# Patient Record
Sex: MALE | Race: BLACK OR AFRICAN AMERICAN | Employment: OTHER | URBAN - METROPOLITAN AREA
[De-identification: names, ages, dates, MRNs, and addresses within clinical notes are randomized per-mention and may not be internally consistent; named-entity substitution may affect disease eponyms.]

---

## 2017-02-20 ENCOUNTER — TELEPHONE (OUTPATIENT)
Dept: FAMILY MEDICINE CLINIC | Facility: CLINIC | Age: 57
End: 2017-02-20

## 2017-04-30 ENCOUNTER — HOSPITAL ENCOUNTER (EMERGENCY)
Age: 57
Discharge: HOME | End: 2017-04-30
Attending: EMERGENCY MEDICINE
Payer: COMMERCIAL

## 2017-04-30 VITALS
BODY MASS INDEX: 28.49 KG/M2 | SYSTOLIC BLOOD PRESSURE: 143 MMHG | DIASTOLIC BLOOD PRESSURE: 88 MMHG | HEART RATE: 78 BPM | OXYGEN SATURATION: 97 % | RESPIRATION RATE: 16 BRPM | HEIGHT: 73 IN | WEIGHT: 215 LBS | TEMPERATURE: 98 F

## 2017-04-30 DIAGNOSIS — R19.7 DIARRHEA OF PRESUMED INFECTIOUS ORIGIN: Primary | ICD-10-CM

## 2017-04-30 DIAGNOSIS — E86.0 DEHYDRATION: ICD-10-CM

## 2017-04-30 PROCEDURE — 85025 COMPLETE CBC W/AUTO DIFF WBC: CPT

## 2017-04-30 PROCEDURE — 99284 EMERGENCY DEPT VISIT MOD MDM: CPT

## 2017-04-30 PROCEDURE — 36415 COLL VENOUS BLD VENIPUNCTURE: CPT

## 2017-04-30 PROCEDURE — 80053 COMPREHEN METABOLIC PANEL: CPT

## 2017-04-30 NOTE — ED PROVIDER NOTES
Patient Seen in: THE [facility] Emergency Department In [place]    History   Patient presents with:  Nausea/Vomiting/Diarrhea (gastrointestinal)    Stated Complaint: diarrhea, joint pain, and headache    HPI    Patient presents with frequent diarrhea over the Cardiovascular: Normal rate, regular rhythm, normal heart sounds and intact distal pulses. No murmur heard. Pulmonary/Chest: Effort normal and breath sounds normal.   Abdominal: Soft. Bowel sounds are increased. There is no tenderness.  There is no gu Clinical Impression:  Diarrhea of presumed infectious origin  (primary encounter diagnosis)  Dehydration    Disposition:  Discharge    Follow-up:  Malini Martinez [facility]  [phone]    In 2 days        Medication

## 2017-05-18 ENCOUNTER — OFFICE VISIT (OUTPATIENT)
Dept: FAMILY MEDICINE CLINIC | Facility: CLINIC | Age: 57
End: 2017-05-18

## 2017-05-18 ENCOUNTER — LAB ENCOUNTER (OUTPATIENT)
Dept: LAB | Age: 57
End: 2017-05-18
Attending: INTERNAL MEDICINE
Payer: COMMERCIAL

## 2017-05-18 VITALS
DIASTOLIC BLOOD PRESSURE: 84 MMHG | BODY MASS INDEX: 28.39 KG/M2 | WEIGHT: 214.19 LBS | HEART RATE: 72 BPM | SYSTOLIC BLOOD PRESSURE: 144 MMHG | HEIGHT: 73 IN | RESPIRATION RATE: 16 BRPM | TEMPERATURE: 98 F

## 2017-05-18 DIAGNOSIS — I10 ESSENTIAL HYPERTENSION: ICD-10-CM

## 2017-05-18 DIAGNOSIS — Z00.00 WELLNESS EXAMINATION: Primary | ICD-10-CM

## 2017-05-18 DIAGNOSIS — Z00.00 LABORATORY EXAM ORDERED AS PART OF ROUTINE GENERAL MEDICAL EXAMINATION: ICD-10-CM

## 2017-05-18 DIAGNOSIS — Z12.5 SCREENING FOR PROSTATE CANCER: ICD-10-CM

## 2017-05-18 DIAGNOSIS — Z12.11 ENCOUNTER FOR SCREENING COLONOSCOPY: ICD-10-CM

## 2017-05-18 PROCEDURE — 99386 PREV VISIT NEW AGE 40-64: CPT | Performed by: INTERNAL MEDICINE

## 2017-05-18 PROCEDURE — 36415 COLL VENOUS BLD VENIPUNCTURE: CPT | Performed by: INTERNAL MEDICINE

## 2017-05-18 PROCEDURE — 90715 TDAP VACCINE 7 YRS/> IM: CPT | Performed by: INTERNAL MEDICINE

## 2017-05-18 PROCEDURE — 80061 LIPID PANEL: CPT | Performed by: INTERNAL MEDICINE

## 2017-05-18 PROCEDURE — 81003 URINALYSIS AUTO W/O SCOPE: CPT | Performed by: INTERNAL MEDICINE

## 2017-05-18 PROCEDURE — 84153 ASSAY OF PSA TOTAL: CPT | Performed by: INTERNAL MEDICINE

## 2017-05-18 PROCEDURE — 90471 IMMUNIZATION ADMIN: CPT | Performed by: INTERNAL MEDICINE

## 2017-05-18 PROCEDURE — 80050 GENERAL HEALTH PANEL: CPT | Performed by: INTERNAL MEDICINE

## 2017-05-18 RX ORDER — AMLODIPINE BESYLATE 10 MG/1
1 TABLET ORAL DAILY
Refills: 5 | COMMUNITY
Start: 2017-03-22 | End: 2017-05-18

## 2017-05-18 RX ORDER — AMLODIPINE BESYLATE 10 MG/1
10 TABLET ORAL DAILY
Qty: 30 TABLET | Refills: 3 | Status: SHIPPED | OUTPATIENT
Start: 2017-05-18 | End: 2017-09-06

## 2017-05-18 NOTE — PROGRESS NOTES
[facility] Internal Medicine Office Note  Chief Complaint:   Patient presents with:  Establish Care      HPI:   This is a 56year old male coming in for establishing care    Blood pressure - started amlodipine 10mg two years ago and had not had f 28.27 kg/m2 Estimated body mass index is 28.27 kg/(m^2) as calculated from the following:    Height as of this encounter: 73\". Weight as of this encounter: 214 lb 3.2 oz. Vital signs reviewed. Appears stated age, well groomed.   Physical Exam   Vitals Screening for prostate cancer  -     PSA; Future    Other orders  -     AmLODIPine Besylate 10 MG Oral Tab;  Take 1 tablet (10 mg total) by mouth daily.  -     TdaP (Adacel, Boostrix) (67346) (DX V06.1/Z23)      Orders Placed This Encounter  CBC With Antoni Chacon

## 2017-05-18 NOTE — PATIENT INSTRUCTIONS
Thank you for choosing Hermilo Partida MD at [place] [address]  To Do: Jazlyn Ramirez.  1. Blood work today  2. Check blood pressure at home at least 2 hours after taking medication.   Call office if most numbers are below 110 for top number (systolic) Janet Bartlett MD orders a CT or MRI, it may take up to 10 business days to receive approval from your insurance company.      Once our office has called informing you that the insurance company approved your testing, please call Central Scheduling at [phone]

## 2017-05-19 DIAGNOSIS — E87.6 HYPOKALEMIA: Primary | ICD-10-CM

## 2017-05-19 DIAGNOSIS — E87.6 LOW BLOOD POTASSIUM: Primary | ICD-10-CM

## 2017-09-07 RX ORDER — AMLODIPINE BESYLATE 10 MG/1
TABLET ORAL
Qty: 30 TABLET | Refills: 0 | Status: SHIPPED | OUTPATIENT
Start: 2017-09-07 | End: 2017-10-03

## 2017-09-07 NOTE — TELEPHONE ENCOUNTER
Hypertension Medications Protocol Passed  Requesting Amlodipine 10mg  LOV: 5/18/17  RTC: none specified  Last Labs: 5/18/17  Filled: 5/18/17 #30with 3 refills    Future Appointments  Date Time Provider Joce Holguin   10/16/2017 1:30 PM Yola Rodriguez

## 2017-10-05 RX ORDER — AMLODIPINE BESYLATE 10 MG/1
TABLET ORAL
Qty: 30 TABLET | Refills: 0 | Status: SHIPPED | OUTPATIENT
Start: 2017-10-05 | End: 2017-12-11

## 2017-10-05 NOTE — TELEPHONE ENCOUNTER
He never had follow up after starting amlodipine with previous physician and has never had follow up after starting amlodipine again with me!   Needs ov. 1 mo refill given

## 2017-11-03 NOTE — TELEPHONE ENCOUNTER
Requesting Amlodipine  LOV: 5/18/17  RTC: not noted  Last Relevant Labs: 5/18/17  Filled: 10/5/17 #30 with 0 refills    Future Appointments  Date Time Provider Joce Holguin   11/13/2017 10:20 AM MD Christopher Mixon Guest       Dr Glen Moyer

## 2017-11-10 RX ORDER — AMLODIPINE BESYLATE 10 MG/1
TABLET ORAL
Qty: 30 TABLET | Refills: 0 | OUTPATIENT
Start: 2017-11-10

## 2017-11-10 NOTE — TELEPHONE ENCOUNTER
Future Appointments  Date Time Provider Joce Holguin   11/13/2017 10:20 AM Jerson Sweeney MD [facility]-     MD gave 1 mo exception last mo and stated patient needed to make appt. We notified patient and have reached out several times.  Med

## 2017-11-13 PROBLEM — Z12.11 COLON CANCER SCREENING: Status: ACTIVE | Noted: 2017-11-13

## 2017-12-08 ENCOUNTER — TELEPHONE (OUTPATIENT)
Dept: FAMILY MEDICINE CLINIC | Facility: CLINIC | Age: 57
End: 2017-12-08

## 2017-12-08 NOTE — TELEPHONE ENCOUNTER
Left message to reset appt as Dr. Casey Busch has relocated to [facility] office. Information given for both locations to reschedule.

## 2017-12-11 RX ORDER — AMLODIPINE BESYLATE 10 MG/1
TABLET ORAL
Qty: 30 TABLET | Refills: 0 | Status: SHIPPED | OUTPATIENT
Start: 2017-12-11 | End: 2018-01-11

## 2017-12-11 NOTE — TELEPHONE ENCOUNTER
Requesting amlodipine, medication pending if appropriate. Last OV 5/18/17, RTC request for 3 weeks. Scheduled for f/u 12/19/17. Last labs drawn 5/18/17, but requested for repeat lab draw in 2 weeks which he did not do.      Last refill 10/5/17 #

## 2017-12-11 NOTE — TELEPHONE ENCOUNTER
Patient called requesting refill for:  AMLODIPINE BESYLATE 10 MG Oral Tab    Has upcoming appointment, but needs refill until then  Future Appointments  Date Time Provider Joce Holguin   12/19/2017 11:00 AM Sunny Cook MD [facility] [place]

## 2017-12-14 ENCOUNTER — HOSPITAL ENCOUNTER (OUTPATIENT)
Age: 57
Discharge: HOME | End: 2017-12-14
Attending: FAMILY MEDICINE
Payer: COMMERCIAL

## 2017-12-14 ENCOUNTER — TELEPHONE (OUTPATIENT)
Dept: INTERNAL MEDICINE CLINIC | Facility: CLINIC | Age: 57
End: 2017-12-14

## 2017-12-14 ENCOUNTER — APPOINTMENT (OUTPATIENT)
Dept: GENERAL RADIOLOGY | Age: 57
End: 2017-12-14
Attending: FAMILY MEDICINE
Payer: COMMERCIAL

## 2017-12-14 VITALS
HEART RATE: 71 BPM | SYSTOLIC BLOOD PRESSURE: 147 MMHG | TEMPERATURE: 98 F | OXYGEN SATURATION: 97 % | WEIGHT: 216 LBS | HEIGHT: 73 IN | RESPIRATION RATE: 20 BRPM | BODY MASS INDEX: 28.63 KG/M2 | DIASTOLIC BLOOD PRESSURE: 98 MMHG

## 2017-12-14 DIAGNOSIS — M94.0 COSTOCHONDRITIS: Primary | ICD-10-CM

## 2017-12-14 PROCEDURE — 99205 OFFICE O/P NEW HI 60 MIN: CPT

## 2017-12-14 PROCEDURE — 84484 ASSAY OF TROPONIN QUANT: CPT

## 2017-12-14 PROCEDURE — 36415 COLL VENOUS BLD VENIPUNCTURE: CPT

## 2017-12-14 PROCEDURE — 99215 OFFICE O/P EST HI 40 MIN: CPT

## 2017-12-14 PROCEDURE — 80047 BASIC METABLC PNL IONIZED CA: CPT

## 2017-12-14 PROCEDURE — 71020 XR CHEST PA + LAT CHEST (CPT=71020): CPT | Performed by: FAMILY MEDICINE

## 2017-12-14 PROCEDURE — 93010 ELECTROCARDIOGRAM REPORT: CPT

## 2017-12-14 PROCEDURE — 93005 ELECTROCARDIOGRAM TRACING: CPT

## 2017-12-14 RX ORDER — NAPROXEN 500 MG/1
500 TABLET ORAL 2 TIMES DAILY PRN
Qty: 20 TABLET | Refills: 0 | Status: SHIPPED | OUTPATIENT
Start: 2017-12-14 | End: 2017-12-21

## 2017-12-14 NOTE — TELEPHONE ENCOUNTER
Patient called with symptoms of chest pains since previous night immediately sent to triage; when transferring patient hung up. Per nurse she called back to discuss further.

## 2017-12-14 NOTE — ED INITIAL ASSESSMENT (HPI)
C/o chest pain for more than 24 hours characterized as dull and achy worse when bending, stretching arm , cough, yawn and sneeze. Denies short of breath.

## 2017-12-14 NOTE — ED PROVIDER NOTES
Patient Seen in: [facility]    History   Patient presents with:  Chest Pain    Stated Complaint: pain in chest when he stretches bends (left side )    HPI    72-year-old male presents for left-sided chest pain.   Patient states h reactive to light. Neck: Normal range of motion. Neck supple. Cardiovascular: Normal rate, regular rhythm, normal heart sounds and intact distal pulses. Pulmonary/Chest: Effort normal and breath sounds normal. No respiratory distress.  He has no whee

## 2017-12-14 NOTE — TELEPHONE ENCOUNTER
Pt called into office stating that he was experiencing chest pain. As psr was getting ready to transfer call pt hung up the phone. Pt called back. Pt stated that since last [address] [facility] at 10pm he began to have L side chest soreness, exacerbated with movement.

## 2017-12-19 ENCOUNTER — OFFICE VISIT (OUTPATIENT)
Dept: INTERNAL MEDICINE CLINIC | Facility: CLINIC | Age: 57
End: 2017-12-19

## 2017-12-19 VITALS
HEIGHT: 73 IN | SYSTOLIC BLOOD PRESSURE: 148 MMHG | HEART RATE: 80 BPM | WEIGHT: 215 LBS | DIASTOLIC BLOOD PRESSURE: 70 MMHG | RESPIRATION RATE: 20 BRPM | BODY MASS INDEX: 28.49 KG/M2 | TEMPERATURE: 98 F

## 2017-12-19 DIAGNOSIS — I10 ESSENTIAL HYPERTENSION: Primary | ICD-10-CM

## 2017-12-19 DIAGNOSIS — E78.2 MIXED HYPERLIPIDEMIA: ICD-10-CM

## 2017-12-19 DIAGNOSIS — N52.9 ERECTILE DYSFUNCTION, UNSPECIFIED ERECTILE DYSFUNCTION TYPE: ICD-10-CM

## 2017-12-19 PROCEDURE — 99213 OFFICE O/P EST LOW 20 MIN: CPT | Performed by: INTERNAL MEDICINE

## 2017-12-19 RX ORDER — LOSARTAN POTASSIUM 25 MG/1
25 TABLET ORAL DAILY
Qty: 30 TABLET | Refills: 1 | Status: SHIPPED | OUTPATIENT
Start: 2017-12-19 | End: 2018-01-09

## 2017-12-19 RX ORDER — SILDENAFIL 50 MG/1
50 TABLET, FILM COATED ORAL
Qty: 10 TABLET | Refills: 5 | Status: SHIPPED | OUTPATIENT
Start: 2017-12-19 | End: 2018-09-04

## 2017-12-19 NOTE — PATIENT INSTRUCTIONS
1. Start losartan daily in addition to amlodipine    2. Take 1/2 tablet of viagra and if not effective, can take full tablet. Do not take more than 1 dose in 24 hours.

## 2017-12-19 NOTE — PROGRESS NOTES
[facility] Internal Medicine Office Note  Chief Complaint:   Patient presents with:  Medication Follow-Up      HPI:   This is a 56year old male coming in for blood pressure check  HPI  Issues with erectile dysfunction for awhile  Difficulty main Location: Left arm, Patient Position: Sitting, Cuff Size: adult)   Pulse 80   Temp 97.8 °F (36.6 °C) (Oral)   Resp 20   Ht 73\"   Wt 215 lb   BMI 28.37 kg/m²  Estimated body mass index is 28.37 kg/m² as calculated from the following:    Height as of this e Dysfunction.            Imaging & Consults:  None    FIT Colorectal Screening due on 01/13/2012  Colonoscopy,10 Years due on 01/13/2012  Influenza Vaccine(1) due on 09/01/2017  Patient/Caregiver Education: Patient/Caregiver Education: There are no barriers

## 2018-01-05 ENCOUNTER — TELEPHONE (OUTPATIENT)
Dept: INTERNAL MEDICINE CLINIC | Facility: CLINIC | Age: 58
End: 2018-01-05

## 2018-01-05 NOTE — TELEPHONE ENCOUNTER
Called patient regarding no show/missed appointment. Left voice message regarding cancellation/no show policy. Per Dr. Tino Bustillo, offered pt to come in today at 1:00 PM to be seen.   (Left voice message)

## 2018-01-10 ENCOUNTER — SURGERY (OUTPATIENT)
Age: 58
End: 2018-01-10

## 2018-01-10 ENCOUNTER — HOSPITAL ENCOUNTER (OUTPATIENT)
Facility: HOSPITAL | Age: 58
Setting detail: HOSPITAL OUTPATIENT SURGERY
Discharge: HOME | End: 2018-01-10
Attending: INTERNAL MEDICINE | Admitting: INTERNAL MEDICINE
Payer: COMMERCIAL

## 2018-01-10 VITALS
OXYGEN SATURATION: 95 % | DIASTOLIC BLOOD PRESSURE: 100 MMHG | RESPIRATION RATE: 18 BRPM | HEIGHT: 73 IN | WEIGHT: 215 LBS | TEMPERATURE: 99 F | HEART RATE: 79 BPM | BODY MASS INDEX: 28.49 KG/M2 | SYSTOLIC BLOOD PRESSURE: 141 MMHG

## 2018-01-10 DIAGNOSIS — Z12.11 SCREENING FOR COLON CANCER: ICD-10-CM

## 2018-01-10 PROCEDURE — 99152 MOD SED SAME PHYS/QHP 5/>YRS: CPT | Performed by: INTERNAL MEDICINE

## 2018-01-10 PROCEDURE — 0DJD8ZZ INSPECTION OF LOWER INTESTINAL TRACT, VIA NATURAL OR ARTIFICIAL OPENING ENDOSCOPIC: ICD-10-PCS | Performed by: INTERNAL MEDICINE

## 2018-01-10 RX ORDER — SODIUM CHLORIDE, SODIUM LACTATE, POTASSIUM CHLORIDE, CALCIUM CHLORIDE 600; 310; 30; 20 MG/100ML; MG/100ML; MG/100ML; MG/100ML
INJECTION, SOLUTION INTRAVENOUS CONTINUOUS
Status: DISCONTINUED | OUTPATIENT
Start: 2018-01-10 | End: 2018-01-10

## 2018-01-10 RX ORDER — MIDAZOLAM HYDROCHLORIDE 1 MG/ML
INJECTION INTRAMUSCULAR; INTRAVENOUS
Status: DISCONTINUED | OUTPATIENT
Start: 2018-01-10 | End: 2018-01-10

## 2018-01-10 NOTE — OPERATIVE REPORT
[facility]  Colonoscopy Report      Craige Scripture.  Patient Status:  Hospital Outpatient Surgery    1960 MRN [id number]   [facility] ENDOSCOPY Attending Tila Sims MD       DATE OF OPERATION: 1/10/2018     PREOPERATIVE DIAG

## 2018-01-10 NOTE — H&P
[facility]  Pre-procedure History and Physical      Caseyjohnathan Garciapatria Ross.  Patient Status:  Hospital Outpatient Surgery    1960 MRN [id number]   [facility] ENDOSCOPY Attending Kathe Bell MD   Hosp Day # 0 PCP Gavin Mercado MD

## 2018-01-13 RX ORDER — AMLODIPINE BESYLATE 10 MG/1
TABLET ORAL
Qty: 30 TABLET | Refills: 2 | Status: SHIPPED | OUTPATIENT
Start: 2018-01-13 | End: 2018-04-17

## 2018-02-13 RX ORDER — LOSARTAN POTASSIUM 25 MG/1
TABLET ORAL
Qty: 30 TABLET | Refills: 1 | Status: SHIPPED | OUTPATIENT
Start: 2018-02-13 | End: 2018-09-03

## 2018-02-13 NOTE — TELEPHONE ENCOUNTER
Losartan 25 mg 1 tab daily filled 12-19-17 30 with 1 refill     LOv 12-19-17     HTN - taking amlodipine and BP at home in 017Z systolic     Essential hypertension - adding losartan 25mg since BP high in office and at home

## 2018-04-18 RX ORDER — AMLODIPINE BESYLATE 10 MG/1
TABLET ORAL
Qty: 30 TABLET | Refills: 2 | Status: SHIPPED | OUTPATIENT
Start: 2018-04-18 | End: 2018-08-13

## 2018-08-14 RX ORDER — AMLODIPINE BESYLATE 10 MG/1
10 TABLET ORAL DAILY
Qty: 90 TABLET | Refills: 0 | Status: SHIPPED | OUTPATIENT
Start: 2018-08-14 | End: 2018-12-31

## 2018-08-14 NOTE — TELEPHONE ENCOUNTER
Refill requested: Amlodipine 10 mg     Failed protocol    Last refill: 4/18/18 #30 2R    Relevant Labs:    BP Readings from Last 3 Encounters:  01/10/18 : 141/100  12/19/17 : 148/70  12/14/17 : 147/98      Last OV / RTC advised: 12/19/17 Dr Charles Adams RTC?   Ap

## 2018-09-03 ENCOUNTER — APPOINTMENT (OUTPATIENT)
Dept: GENERAL RADIOLOGY | Age: 58
End: 2018-09-03
Attending: EMERGENCY MEDICINE
Payer: COMMERCIAL

## 2018-09-03 ENCOUNTER — HOSPITAL ENCOUNTER (EMERGENCY)
Age: 58
Discharge: HOME | End: 2018-09-03
Payer: COMMERCIAL

## 2018-09-03 VITALS
TEMPERATURE: 99 F | RESPIRATION RATE: 16 BRPM | WEIGHT: 210 LBS | HEART RATE: 67 BPM | HEIGHT: 73 IN | DIASTOLIC BLOOD PRESSURE: 89 MMHG | SYSTOLIC BLOOD PRESSURE: 130 MMHG | OXYGEN SATURATION: 98 % | BODY MASS INDEX: 27.83 KG/M2

## 2018-09-03 DIAGNOSIS — M25.462 KNEE EFFUSION, LEFT: Primary | ICD-10-CM

## 2018-09-03 PROCEDURE — 73562 X-RAY EXAM OF KNEE 3: CPT | Performed by: EMERGENCY MEDICINE

## 2018-09-03 PROCEDURE — 99283 EMERGENCY DEPT VISIT LOW MDM: CPT | Performed by: NURSE PRACTITIONER

## 2018-09-03 RX ORDER — IBUPROFEN 600 MG/1
600 TABLET ORAL ONCE
Status: COMPLETED | OUTPATIENT
Start: 2018-09-03 | End: 2018-09-03

## 2018-09-03 NOTE — ED PROVIDER NOTES
Patient Seen in: THE [facility] Emergency Department In [place]    History   Patient presents with:  Lower Extremity Injury (musculoskeletal)    Stated Complaint: left knee swelling and pain    HPI  Patient is a 16-year-old gentleman who presents with left kn Pulmonary/Chest: Effort normal.   Musculoskeletal: He exhibits tenderness.    Left Knee Exam:   - Gross deformity: None  - Soft tissue swelling: suprapatellar  - Effusion: small  - Discoloration/ ecchymosis: None  - ROM: flexion to   - Patellar testing: T ibuprofen and/or acetaminophen as needed for discomfort.   Patient to follow-up with orthopedic referral.          Disposition and Plan     Clinical Impression:  Knee effusion, left  (primary encounter diagnosis)    Disposition:  Discharge  9/3/2018 12:19 p

## 2018-09-03 NOTE — ED INITIAL ASSESSMENT (HPI)
Pt states in June standing on his bed to clean the ceiling fan, has been painful and swollen, right foot got caught in foot board and fell  Off the bed onto left knee

## 2018-09-04 PROBLEM — M23.92 INTERNAL DERANGEMENT OF KNEE, LEFT: Status: ACTIVE | Noted: 2018-09-04

## 2018-09-04 PROBLEM — M65.9 SYNOVITIS OF LEFT KNEE: Status: ACTIVE | Noted: 2018-09-04

## 2018-10-03 PROBLEM — M25.462 EFFUSION, LEFT KNEE: Status: ACTIVE | Noted: 2018-10-03

## 2019-01-01 NOTE — TELEPHONE ENCOUNTER
Refill requested:   Requested Prescriptions     Pending Prescriptions Disp Refills   • AMLODIPINE BESYLATE 10 MG Oral Tab [Pharmacy Med Name: AMLODIPINE BESYLATE 10MG TABLETS] 90 tablet 0     Sig: TAKE 1 TABLET(10 MG) BY MOUTH DAILY       Failed protocol

## 2019-01-02 RX ORDER — AMLODIPINE BESYLATE 10 MG/1
TABLET ORAL
Qty: 30 TABLET | Refills: 0 | Status: SHIPPED | OUTPATIENT
Start: 2019-01-02 | End: 2019-03-28

## 2019-01-22 ENCOUNTER — OFFICE VISIT (OUTPATIENT)
Dept: INTERNAL MEDICINE CLINIC | Facility: CLINIC | Age: 59
End: 2019-01-22
Payer: COMMERCIAL

## 2019-01-22 VITALS
WEIGHT: 217.5 LBS | TEMPERATURE: 98 F | RESPIRATION RATE: 18 BRPM | HEART RATE: 89 BPM | BODY MASS INDEX: 28.83 KG/M2 | SYSTOLIC BLOOD PRESSURE: 148 MMHG | HEIGHT: 73 IN | DIASTOLIC BLOOD PRESSURE: 84 MMHG | OXYGEN SATURATION: 95 %

## 2019-01-22 DIAGNOSIS — Z12.5 SCREENING FOR PROSTATE CANCER: ICD-10-CM

## 2019-01-22 DIAGNOSIS — N52.9 ERECTILE DYSFUNCTION, UNSPECIFIED ERECTILE DYSFUNCTION TYPE: ICD-10-CM

## 2019-01-22 DIAGNOSIS — I10 ESSENTIAL HYPERTENSION: Primary | ICD-10-CM

## 2019-01-22 DIAGNOSIS — Z00.00 LABORATORY EXAM ORDERED AS PART OF ROUTINE GENERAL MEDICAL EXAMINATION: ICD-10-CM

## 2019-01-22 PROCEDURE — 99213 OFFICE O/P EST LOW 20 MIN: CPT | Performed by: INTERNAL MEDICINE

## 2019-01-22 RX ORDER — SILDENAFIL 50 MG/1
50 TABLET, FILM COATED ORAL
Qty: 90 TABLET | Refills: 3 | Status: SHIPPED | OUTPATIENT
Start: 2019-01-22 | End: 2019-07-12 | Stop reason: ALTCHOICE

## 2019-01-22 RX ORDER — LOSARTAN POTASSIUM 25 MG/1
25 TABLET ORAL DAILY
Qty: 90 TABLET | Refills: 1 | Status: SHIPPED | OUTPATIENT
Start: 2019-01-22 | End: 2019-05-24 | Stop reason: ALTCHOICE

## 2019-01-22 NOTE — PROGRESS NOTES
[facility] Internal Medicine Office Note  Chief Complaint:   Patient presents with:  Medication Follow-Up      HPI:   This is a 64year old male coming in for blood pressure check  HPI    He started losartan but only took for a few days after las Height as of this encounter: 73\". Weight as of this encounter: 217 lb 8 oz. Vital signs reviewed. Appears stated age, well groomed. Physical Exam   Vitals reviewed. Constitutional: He appears well-developed. No distress.    HENT:   Head: Normoceph 05/18/2018  Patient/Caregiver Education: Patient/Caregiver Education: There are no barriers to learning. Medical education done. Outcome: Patient verbalizes understanding.  Patient is notified to call with any questions, complications, allergies, or worseni

## 2019-01-22 NOTE — PATIENT INSTRUCTIONS
Start losartan once daily and continue amlodipine    Check blood pressure at home and bring in log of blood pressure readings (check mid-day when both medications are fully in your system would be best)

## 2019-03-14 ENCOUNTER — OFFICE VISIT (OUTPATIENT)
Dept: FAMILY MEDICINE CLINIC | Facility: CLINIC | Age: 59
End: 2019-03-14
Payer: COMMERCIAL

## 2019-03-14 VITALS
OXYGEN SATURATION: 98 % | HEART RATE: 78 BPM | HEIGHT: 73 IN | RESPIRATION RATE: 18 BRPM | TEMPERATURE: 99 F | SYSTOLIC BLOOD PRESSURE: 134 MMHG | DIASTOLIC BLOOD PRESSURE: 70 MMHG | BODY MASS INDEX: 28.23 KG/M2 | WEIGHT: 213 LBS

## 2019-03-14 DIAGNOSIS — R05.9 COUGH IN ADULT PATIENT: Primary | ICD-10-CM

## 2019-03-14 DIAGNOSIS — R19.7 DIARRHEA, UNSPECIFIED TYPE: ICD-10-CM

## 2019-03-14 PROCEDURE — 99213 OFFICE O/P EST LOW 20 MIN: CPT | Performed by: NURSE PRACTITIONER

## 2019-03-14 RX ORDER — AZITHROMYCIN 250 MG/1
TABLET, FILM COATED ORAL
Qty: 6 TABLET | Refills: 0 | Status: SHIPPED | OUTPATIENT
Start: 2019-03-14 | End: 2019-05-24 | Stop reason: ALTCHOICE

## 2019-03-14 NOTE — PATIENT INSTRUCTIONS
·  PLAN: Zithromax take as directed. Finish all the medication even if you feel better. · Probiotics or yogurt daily during antibiotic use will help decrease stomach upset and restore good bacteria to the gut.  Double up at mealtime for at least two weeks

## 2019-03-14 NOTE — PROGRESS NOTES
HPI:   Mansi Hill. is a 64year old male who presents with ill symptoms for  4  days. Patient reports body aches and chills, today began with diarrhea. Has tried Nyquil and Mucinex with mild intermittent relief.        Current Outpatient Medications: supple,positive anterior/posterior cervical adenopathy  LUNGS: clear to auscultation all lobes  CARDIO: RRR without murmur      ASSESSMENT AND PLAN:    PLAN:John was seen today for body ache and/or chills, diarrhea and cough.     Diagnoses and all orders if not improving,  or sooner if worsening symptoms. Seek immediate care if inability to swallow or breathe.

## 2019-03-29 RX ORDER — AMLODIPINE BESYLATE 10 MG/1
TABLET ORAL
Qty: 30 TABLET | Refills: 0 | Status: SHIPPED | OUTPATIENT
Start: 2019-03-29 | End: 2019-06-27

## 2019-06-27 NOTE — TELEPHONE ENCOUNTER
Patient calling in requesting a refill for AMLODIPINE BESYLATE 10 MG Oral Tab  Upcoming Appointment: 07/05/19    To be sent to:  [address], [address] [place] [address], [phone], [phone]

## 2019-06-28 RX ORDER — AMLODIPINE BESYLATE 10 MG/1
10 TABLET ORAL
Qty: 90 TABLET | Refills: 1 | Status: SHIPPED | OUTPATIENT
Start: 2019-06-28 | End: 2021-01-21

## 2019-06-28 NOTE — TELEPHONE ENCOUNTER
Amlodipine Bestlate 10 MG Oral tab   Take one tablet daily by mouth       Failed Protocol   CMP or BMP in past 12 months         Last OV relevant to medication: 01/22/2019  Last refill date: 03/29/2019   #/refills: #30, 0   When pt was asked to return for

## 2019-07-12 ENCOUNTER — OFFICE VISIT (OUTPATIENT)
Dept: INTERNAL MEDICINE CLINIC | Facility: CLINIC | Age: 59
End: 2019-07-12
Payer: COMMERCIAL

## 2019-07-12 VITALS
RESPIRATION RATE: 16 BRPM | BODY MASS INDEX: 29.69 KG/M2 | TEMPERATURE: 98 F | SYSTOLIC BLOOD PRESSURE: 146 MMHG | WEIGHT: 224 LBS | HEIGHT: 73 IN | OXYGEN SATURATION: 100 % | HEART RATE: 86 BPM | DIASTOLIC BLOOD PRESSURE: 88 MMHG

## 2019-07-12 DIAGNOSIS — I10 ESSENTIAL HYPERTENSION: ICD-10-CM

## 2019-07-12 DIAGNOSIS — Z00.00 ENCOUNTER FOR ROUTINE ADULT MEDICAL EXAMINATION: Primary | ICD-10-CM

## 2019-07-12 PROCEDURE — 99396 PREV VISIT EST AGE 40-64: CPT | Performed by: INTERNAL MEDICINE

## 2019-07-12 RX ORDER — AMLODIPINE BESYLATE 10 MG/1
10 TABLET ORAL DAILY
Qty: 90 TABLET | Refills: 1 | Status: SHIPPED | OUTPATIENT
Start: 2019-07-12 | End: 2020-05-10

## 2019-07-12 RX ORDER — LOSARTAN POTASSIUM 25 MG/1
25 TABLET ORAL DAILY
Qty: 90 TABLET | Refills: 1 | Status: SHIPPED | OUTPATIENT
Start: 2019-07-12 | End: 2020-05-10

## 2019-07-12 NOTE — PATIENT INSTRUCTIONS
Take amlodipine 10mg in the morning and 1/2 tablet of losartan in the evening     Follow up for blood work - test before 10 am due to Dr. Elias Hooker order for testosterone

## 2019-07-12 NOTE — PROGRESS NOTES
[facility] Internal Medicine Office Note  Chief Complaint:   Patient presents with:   Follow - Up: Blood pressure      HPI:   This is a 64year old male coming in for blood pressure follow up  HPI  HTN - He got dizzy when taking amlodipine and los Neurological: Negative. Hematological: Negative. Psychiatric/Behavioral: Negative.          EXAM:   /88 (BP Location: Right arm, Patient Position: Sitting, Cuff Size: large)   Pulse 86   Temp 98.2 °F (36.8 °C) (Oral)   Resp 16   Ht 73\"   Wt 2 by mouth daily. Meds & Refills for this Visit:  Requested Prescriptions     Signed Prescriptions Disp Refills   • Losartan Potassium 25 MG Oral Tab 90 tablet 1     Sig: Take 1 tablet (25 mg total) by mouth daily.    • amLODIPine Besylate 10 MG Oral Tab 9

## 2019-07-27 ENCOUNTER — LAB ENCOUNTER (OUTPATIENT)
Dept: LAB | Age: 59
End: 2019-07-27
Attending: INTERNAL MEDICINE
Payer: COMMERCIAL

## 2019-07-27 DIAGNOSIS — Z00.00 LABORATORY EXAM ORDERED AS PART OF ROUTINE GENERAL MEDICAL EXAMINATION: ICD-10-CM

## 2019-07-27 DIAGNOSIS — Z12.5 SCREENING FOR PROSTATE CANCER: ICD-10-CM

## 2019-07-27 DIAGNOSIS — N52.01 ERECTILE DYSFUNCTION DUE TO ARTERIAL INSUFFICIENCY: ICD-10-CM

## 2019-07-27 LAB
ALBUMIN SERPL-MCNC: 3.5 G/DL (ref 3.4–5)
ALBUMIN/GLOB SERPL: 0.9 {RATIO} (ref 1–2)
ALP LIVER SERPL-CCNC: 89 U/L (ref 45–117)
ALT SERPL-CCNC: 38 U/L (ref 16–61)
ANION GAP SERPL CALC-SCNC: 7 MMOL/L (ref 0–18)
AST SERPL-CCNC: 26 U/L (ref 15–37)
BASOPHILS # BLD AUTO: 0.05 X10(3) UL (ref 0–0.2)
BASOPHILS NFR BLD AUTO: 1.1 %
BILIRUB SERPL-MCNC: 0.5 MG/DL (ref 0.1–2)
BUN BLD-MCNC: 13 MG/DL (ref 7–18)
BUN/CREAT SERPL: 9.6 (ref 10–20)
CALCIUM BLD-MCNC: 8.6 MG/DL (ref 8.5–10.1)
CHLORIDE SERPL-SCNC: 106 MMOL/L (ref 98–112)
CHOLEST SMN-MCNC: 252 MG/DL (ref ?–200)
CO2 SERPL-SCNC: 28 MMOL/L (ref 21–32)
COMPLEXED PSA SERPL-MCNC: 1.37 NG/ML (ref ?–4)
CREAT BLD-MCNC: 1.35 MG/DL (ref 0.7–1.3)
DEPRECATED RDW RBC AUTO: 39.1 FL (ref 35.1–46.3)
EOSINOPHIL # BLD AUTO: 0.26 X10(3) UL (ref 0–0.7)
EOSINOPHIL NFR BLD AUTO: 5.5 %
ERYTHROCYTE [DISTWIDTH] IN BLOOD BY AUTOMATED COUNT: 13.1 % (ref 11–15)
GLOBULIN PLAS-MCNC: 4 G/DL (ref 2.8–4.4)
GLUCOSE BLD-MCNC: 86 MG/DL (ref 70–99)
HCT VFR BLD AUTO: 45.6 % (ref 39–53)
HDLC SERPL-MCNC: 42 MG/DL (ref 40–59)
HGB BLD-MCNC: 16.3 G/DL (ref 13–17.5)
IMM GRANULOCYTES # BLD AUTO: 0 X10(3) UL (ref 0–1)
IMM GRANULOCYTES NFR BLD: 0 %
LDLC SERPL CALC-MCNC: 141 MG/DL (ref ?–100)
LYMPHOCYTES # BLD AUTO: 1.76 X10(3) UL (ref 1–4)
LYMPHOCYTES NFR BLD AUTO: 37.1 %
M PROTEIN MFR SERPL ELPH: 7.5 G/DL (ref 6.4–8.2)
MCH RBC QN AUTO: 29.4 PG (ref 26–34)
MCHC RBC AUTO-ENTMCNC: 35.7 G/DL (ref 31–37)
MCV RBC AUTO: 82.3 FL (ref 80–100)
MONOCYTES # BLD AUTO: 0.51 X10(3) UL (ref 0.1–1)
MONOCYTES NFR BLD AUTO: 10.7 %
NEUTROPHILS # BLD AUTO: 2.17 X10 (3) UL (ref 1.5–7.7)
NEUTROPHILS # BLD AUTO: 2.17 X10(3) UL (ref 1.5–7.7)
NEUTROPHILS NFR BLD AUTO: 45.6 %
NONHDLC SERPL-MCNC: 210 MG/DL (ref ?–130)
OSMOLALITY SERPL CALC.SUM OF ELEC: 291 MOSM/KG (ref 275–295)
PLATELET # BLD AUTO: 271 10(3)UL (ref 150–450)
POTASSIUM SERPL-SCNC: 3.2 MMOL/L (ref 3.5–5.1)
RBC # BLD AUTO: 5.54 X10(6)UL (ref 4.3–5.7)
SODIUM SERPL-SCNC: 141 MMOL/L (ref 136–145)
TESTOST SERPL-MCNC: 206.81 NG/DL
TRIGL SERPL-MCNC: 347 MG/DL (ref 30–149)
TSI SER-ACNC: 1.7 MIU/ML (ref 0.36–3.74)
VLDLC SERPL CALC-MCNC: 69 MG/DL (ref 0–30)
WBC # BLD AUTO: 4.8 X10(3) UL (ref 4–11)

## 2019-07-27 PROCEDURE — 84153 ASSAY OF PSA TOTAL: CPT | Performed by: INTERNAL MEDICINE

## 2019-07-27 PROCEDURE — 80061 LIPID PANEL: CPT | Performed by: INTERNAL MEDICINE

## 2019-07-27 PROCEDURE — 36415 COLL VENOUS BLD VENIPUNCTURE: CPT | Performed by: INTERNAL MEDICINE

## 2019-07-27 PROCEDURE — 80050 GENERAL HEALTH PANEL: CPT | Performed by: INTERNAL MEDICINE

## 2019-07-29 DIAGNOSIS — E87.6 HYPOKALEMIA: Primary | ICD-10-CM

## 2019-07-29 RX ORDER — POTASSIUM CHLORIDE 750 MG/1
10 TABLET, FILM COATED, EXTENDED RELEASE ORAL DAILY
Qty: 30 TABLET | Refills: 0 | Status: SHIPPED | OUTPATIENT
Start: 2019-07-29 | End: 2019-10-13

## 2019-08-02 ENCOUNTER — APPOINTMENT (OUTPATIENT)
Dept: LAB | Age: 59
End: 2019-08-02
Attending: INTERNAL MEDICINE
Payer: COMMERCIAL

## 2019-08-02 DIAGNOSIS — E87.6 HYPOKALEMIA: ICD-10-CM

## 2019-08-02 LAB
ANION GAP SERPL CALC-SCNC: 5 MMOL/L (ref 0–18)
BUN BLD-MCNC: 12 MG/DL (ref 7–18)
BUN/CREAT SERPL: 9.6 (ref 10–20)
CALCIUM BLD-MCNC: 9.2 MG/DL (ref 8.5–10.1)
CHLORIDE SERPL-SCNC: 103 MMOL/L (ref 98–112)
CO2 SERPL-SCNC: 32 MMOL/L (ref 21–32)
CREAT BLD-MCNC: 1.25 MG/DL (ref 0.7–1.3)
GLUCOSE BLD-MCNC: 97 MG/DL (ref 70–99)
OSMOLALITY SERPL CALC.SUM OF ELEC: 290 MOSM/KG (ref 275–295)
POTASSIUM SERPL-SCNC: 3.2 MMOL/L (ref 3.5–5.1)
SODIUM SERPL-SCNC: 140 MMOL/L (ref 136–145)

## 2019-08-02 PROCEDURE — 80048 BASIC METABOLIC PNL TOTAL CA: CPT | Performed by: INTERNAL MEDICINE

## 2019-08-02 PROCEDURE — 36415 COLL VENOUS BLD VENIPUNCTURE: CPT | Performed by: INTERNAL MEDICINE

## 2019-10-13 DIAGNOSIS — E87.6 HYPOKALEMIA: Primary | ICD-10-CM

## 2019-10-16 ENCOUNTER — HOSPITAL ENCOUNTER (EMERGENCY)
Age: 59
Discharge: HOME | End: 2019-10-16
Payer: COMMERCIAL

## 2019-10-16 VITALS
TEMPERATURE: 99 F | HEIGHT: 73 IN | WEIGHT: 223 LBS | BODY MASS INDEX: 29.55 KG/M2 | HEART RATE: 93 BPM | RESPIRATION RATE: 20 BRPM | SYSTOLIC BLOOD PRESSURE: 175 MMHG | DIASTOLIC BLOOD PRESSURE: 105 MMHG | OXYGEN SATURATION: 98 %

## 2019-10-16 DIAGNOSIS — J01.00 ACUTE NON-RECURRENT MAXILLARY SINUSITIS: Primary | ICD-10-CM

## 2019-10-16 PROCEDURE — 99283 EMERGENCY DEPT VISIT LOW MDM: CPT

## 2019-10-16 RX ORDER — PREDNISONE 20 MG/1
40 TABLET ORAL DAILY
Qty: 10 TABLET | Refills: 0 | Status: SHIPPED | OUTPATIENT
Start: 2019-10-16 | End: 2019-10-21

## 2019-10-16 RX ORDER — AMOXICILLIN AND CLAVULANATE POTASSIUM 875; 125 MG/1; MG/1
1 TABLET, FILM COATED ORAL 2 TIMES DAILY
Qty: 20 TABLET | Refills: 0 | Status: SHIPPED | OUTPATIENT
Start: 2019-10-16 | End: 2019-10-26

## 2019-10-16 RX ORDER — FLUTICASONE PROPIONATE 50 MCG
2 SPRAY, SUSPENSION (ML) NASAL DAILY
Qty: 16 G | Refills: 0 | Status: SHIPPED | OUTPATIENT
Start: 2019-10-16 | End: 2019-11-15

## 2019-10-16 NOTE — TELEPHONE ENCOUNTER
POTASSIUM CHLORIDE ER 10 MEQ     Last OV relevant to medication: 7-     Last refill date: 7- #   30 tabs with 0 refills     When pt was asked to return for OV: 3 weeks     Upcoming appt/reason: none     Labs: 7- # cbc, lipid, cmp

## 2019-10-16 NOTE — TELEPHONE ENCOUNTER
POTASSIUM CHLORIDE ER 10 MEQ     Last OV relevant to medication: 7-     Last refill date: 7- # 30 tabs with 0 refills     When pt was asked to return for OV: 3 weeks    Upcoming appt/reason: none    Labs: 7- # lipid, tsh, psa

## 2019-10-17 RX ORDER — POTASSIUM CHLORIDE 750 MG/1
TABLET, FILM COATED, EXTENDED RELEASE ORAL
Qty: 30 TABLET | Refills: 0 | OUTPATIENT
Start: 2019-10-17

## 2019-10-17 RX ORDER — POTASSIUM CHLORIDE 750 MG/1
TABLET, FILM COATED, EXTENDED RELEASE ORAL
Qty: 30 TABLET | Refills: 0 | Status: SHIPPED | OUTPATIENT
Start: 2019-10-17 | End: 2020-05-10

## 2019-10-17 NOTE — TELEPHONE ENCOUNTER
Called patient to inform him that he is due to get BMP labs done.  VM was full so was unable to leave message      Will try to call again at later time

## 2019-10-17 NOTE — ED PROVIDER NOTES
Patient Seen in: [facility] In [place]      History   Patient presents with:  Cough/URI    Stated Complaint: sinus congestion    HPI    Patient is a pleasant 16-year-old male.   His entire family just returned from a vacation on a [employer] warm and dry, no induration or sign of infection. Neuro: Cranial nerves intact, Normal Gait. ED Course   Labs Reviewed - No data to display               MDM     Patient will be treated for acute left maxillary sinusitis.   Augmentin, Flonase, Medrol D

## 2020-03-08 ENCOUNTER — HOSPITAL ENCOUNTER (EMERGENCY)
Age: 60
Discharge: HOME | End: 2020-03-08
Attending: EMERGENCY MEDICINE
Payer: COMMERCIAL

## 2020-03-08 VITALS
SYSTOLIC BLOOD PRESSURE: 183 MMHG | RESPIRATION RATE: 16 BRPM | TEMPERATURE: 98 F | BODY MASS INDEX: 29.16 KG/M2 | HEIGHT: 73 IN | OXYGEN SATURATION: 95 % | DIASTOLIC BLOOD PRESSURE: 108 MMHG | HEART RATE: 106 BPM | WEIGHT: 220 LBS

## 2020-03-08 DIAGNOSIS — J35.8 TONSILLOLITH: Primary | ICD-10-CM

## 2020-03-08 PROCEDURE — 87430 STREP A AG IA: CPT | Performed by: EMERGENCY MEDICINE

## 2020-03-08 PROCEDURE — 99283 EMERGENCY DEPT VISIT LOW MDM: CPT

## 2020-03-08 PROCEDURE — 87081 CULTURE SCREEN ONLY: CPT | Performed by: EMERGENCY MEDICINE

## 2020-03-08 RX ORDER — PENICILLIN V POTASSIUM 500 MG/1
500 TABLET ORAL 4 TIMES DAILY
Qty: 40 TABLET | Refills: 0 | Status: SHIPPED | OUTPATIENT
Start: 2020-03-08 | End: 2020-03-18

## 2020-03-09 NOTE — ED PROVIDER NOTES
Patient Seen in: [facility] Emergency Department In [place]      History   Patient presents with:  Sore Throat    Stated Complaint: sore throat \"white dot in throat\"    HPI    53-year-old male presents emergency department complaining of a sore throat s meningismus no carotid bruit  CV: Regular rate and rhythm no murmur rub  Respiratory: Clear to auscultation bilaterally no crackles no wheezes no accessory muscle use  Abdomen: Soft nontender nondistended, no rebound no guarding  no hepatosplenomegaly alejandro (500 mg total) by mouth 4 (four) times daily for 10 days. , Print, Disp-40 tablet, R-0

## 2020-05-05 ENCOUNTER — OFFICE VISIT (OUTPATIENT)
Dept: FAMILY MEDICINE CLINIC | Facility: CLINIC | Age: 60
End: 2020-05-05
Payer: COMMERCIAL

## 2020-05-05 VITALS
HEIGHT: 73 IN | TEMPERATURE: 97 F | OXYGEN SATURATION: 97 % | BODY MASS INDEX: 28.36 KG/M2 | SYSTOLIC BLOOD PRESSURE: 150 MMHG | DIASTOLIC BLOOD PRESSURE: 93 MMHG | WEIGHT: 214 LBS | HEART RATE: 86 BPM

## 2020-05-05 DIAGNOSIS — I10 ESSENTIAL HYPERTENSION: ICD-10-CM

## 2020-05-05 DIAGNOSIS — M54.42 ACUTE LEFT-SIDED LOW BACK PAIN WITH LEFT-SIDED SCIATICA: Primary | ICD-10-CM

## 2020-05-05 PROCEDURE — 99213 OFFICE O/P EST LOW 20 MIN: CPT | Performed by: PHYSICIAN ASSISTANT

## 2020-05-05 RX ORDER — CYCLOBENZAPRINE HCL 10 MG
10 TABLET ORAL EVERY 8 HOURS PRN
Qty: 30 TABLET | Refills: 0 | Status: SHIPPED | OUTPATIENT
Start: 2020-05-05 | End: 2021-05-27

## 2020-05-05 RX ORDER — AMLODIPINE BESYLATE 10 MG/1
10 TABLET ORAL DAILY
Qty: 30 TABLET | Refills: 0 | Status: SHIPPED | OUTPATIENT
Start: 2020-05-05 | End: 2020-05-10

## 2020-05-05 RX ORDER — METHYLPREDNISOLONE 4 MG/1
TABLET ORAL
Qty: 1 PACKAGE | Refills: 0 | Status: SHIPPED | OUTPATIENT
Start: 2020-05-05 | End: 2021-05-27 | Stop reason: ALTCHOICE

## 2020-05-05 NOTE — PROGRESS NOTES
CHIEF COMPLAINT:     Patient presents with:  Back Pain      HPI:   Jeb Ross is a 64year old male who presents with complaints of left lower back pain for the past week.   The patient denies any specific injury, but reports he lifted the  fever, chills,weight change, decreased appetite  SKIN: Denies rashes, skin wounds or ulcers.   EYES: Denies blurred vision or double vision  HENT: Denies congestion, rhinorrhea, sore throat or ear pain  CHEST: Denies chest pain, or palpitations  LUNGS: Ange Quick Normal ROM. NEG straight leg raise bilaterally. Patient declined imaging. Patient informed of heart murmur and advised to call PCP today to discuss finding for further work up. If CP, SOB, dizziness of LE swelling to the ER.   Patient verbalizes

## 2020-05-05 NOTE — PATIENT INSTRUCTIONS
Patient Declined AVS    Verbal Instructions given      1. Medrol- No NSAIDs  2. Rest  3. Flexeril  4. Follow up with PCP  5. Call PCP today in regards to heart murmur  6.   If worsening symptoms to ER

## 2020-05-10 ENCOUNTER — OFFICE VISIT (OUTPATIENT)
Dept: FAMILY MEDICINE CLINIC | Facility: CLINIC | Age: 60
End: 2020-05-10
Payer: COMMERCIAL

## 2020-05-10 ENCOUNTER — APPOINTMENT (OUTPATIENT)
Dept: CT IMAGING | Age: 60
End: 2020-05-10
Attending: EMERGENCY MEDICINE
Payer: COMMERCIAL

## 2020-05-10 ENCOUNTER — HOSPITAL ENCOUNTER (EMERGENCY)
Age: 60
Discharge: HOME | End: 2020-05-10
Attending: EMERGENCY MEDICINE
Payer: COMMERCIAL

## 2020-05-10 VITALS
OXYGEN SATURATION: 94 % | HEIGHT: 73 IN | HEART RATE: 93 BPM | BODY MASS INDEX: 28.49 KG/M2 | WEIGHT: 215 LBS | DIASTOLIC BLOOD PRESSURE: 93 MMHG | SYSTOLIC BLOOD PRESSURE: 153 MMHG | TEMPERATURE: 98 F | RESPIRATION RATE: 18 BRPM

## 2020-05-10 DIAGNOSIS — S76.212A GROIN STRAIN, LEFT, INITIAL ENCOUNTER: Primary | ICD-10-CM

## 2020-05-10 DIAGNOSIS — R31.29 OTHER MICROSCOPIC HEMATURIA: ICD-10-CM

## 2020-05-10 DIAGNOSIS — M54.16 LUMBAR RADICULOPATHY: ICD-10-CM

## 2020-05-10 DIAGNOSIS — R10.32 LEFT LOWER QUADRANT ABDOMINAL PAIN: Primary | ICD-10-CM

## 2020-05-10 PROCEDURE — 99284 EMERGENCY DEPT VISIT MOD MDM: CPT

## 2020-05-10 PROCEDURE — 96361 HYDRATE IV INFUSION ADD-ON: CPT

## 2020-05-10 PROCEDURE — 80053 COMPREHEN METABOLIC PANEL: CPT | Performed by: EMERGENCY MEDICINE

## 2020-05-10 PROCEDURE — 96374 THER/PROPH/DIAG INJ IV PUSH: CPT

## 2020-05-10 PROCEDURE — 81003 URINALYSIS AUTO W/O SCOPE: CPT | Performed by: EMERGENCY MEDICINE

## 2020-05-10 PROCEDURE — 81003 URINALYSIS AUTO W/O SCOPE: CPT | Performed by: NURSE PRACTITIONER

## 2020-05-10 PROCEDURE — 74176 CT ABD & PELVIS W/O CONTRAST: CPT | Performed by: EMERGENCY MEDICINE

## 2020-05-10 PROCEDURE — 85025 COMPLETE CBC W/AUTO DIFF WBC: CPT | Performed by: EMERGENCY MEDICINE

## 2020-05-10 RX ORDER — DIAZEPAM 5 MG/1
5 TABLET ORAL 3 TIMES DAILY PRN
Qty: 20 TABLET | Refills: 0 | Status: SHIPPED | OUTPATIENT
Start: 2020-05-10 | End: 2020-05-20

## 2020-05-10 RX ORDER — KETOROLAC TROMETHAMINE 30 MG/ML
15 INJECTION, SOLUTION INTRAMUSCULAR; INTRAVENOUS ONCE
Status: COMPLETED | OUTPATIENT
Start: 2020-05-10 | End: 2020-05-10

## 2020-05-10 RX ORDER — TRAMADOL HYDROCHLORIDE 50 MG/1
TABLET ORAL EVERY 6 HOURS PRN
Qty: 10 TABLET | Refills: 0 | Status: SHIPPED | OUTPATIENT
Start: 2020-05-10 | End: 2020-05-17

## 2020-05-10 RX ORDER — NAPROXEN 500 MG/1
500 TABLET ORAL 2 TIMES DAILY PRN
Qty: 20 TABLET | Refills: 0 | Status: SHIPPED | OUTPATIENT
Start: 2020-05-10 | End: 2020-05-17

## 2020-05-10 NOTE — ED INITIAL ASSESSMENT (HPI)
Pt c/o lower back pain that started about 2.5 weeks ago. Sts he had some numbness in left leg at that time. Was seen at urgent care 5/5 and was given muscle relaxer and steroid. Went back to UC today for continued pain in left groin and lateral leg.

## 2020-05-10 NOTE — PROGRESS NOTES
Ricardo Rhodes. is a 64year old male who presents to [facility] with c/o left side lower abdominal pain radiating down toward left inguinal area. Seen on 5/5 and started on medrol dose pack and flexeril for lower back pain with sciatica.  Notes back pain resolv

## 2020-05-10 NOTE — ED PROVIDER NOTES
Patient Seen in: [facility] In [place]      History   Patient presents with:  Abdomen/Flank Pain  Back Pain    Stated Complaint: LLQ abd pain    HPI    28-year-old male presents with pain in his left lower quadrant groin region and al Temp src    SpO2 94 %   O2 Device None (Room air)       Current:BP (!) 153/93   Pulse 93   Temp 98.4 °F (36.9 °C)   Resp 18   Ht 185.4 cm (6' 1\")   Wt 97.5 kg   SpO2 94%   BMI 28.37 kg/m²         Physical Exam  Vitals signs and nursing note reviewed. Abnormality         Status                     ---------                               -----------         ------                     CBC W/ DIFFERENTIAL[[id number]]          Abnormal            Final result                 Please view result Unremarkable. BONES:  No acute process seen. PELVIC ORGANS:  Unremarkable. LUNG BASES:  No acute disease process. CONCLUSION:  No kidney stone, hydronephrosis, or other abnormality seen.    Dictated by: Kristin Finn MD on 5/10/2020 at 4:14 PM Tab  Take 1 tablet (5 mg total) by mouth 3 (three) times daily as needed (muscle spasm).   Qty: 20 tablet Refills: 0

## 2021-01-18 RX ORDER — AMLODIPINE BESYLATE 10 MG/1
TABLET ORAL
Qty: 90 TABLET | Refills: 1 | Status: CANCELLED | OUTPATIENT
Start: 2021-01-18

## 2021-01-19 NOTE — TELEPHONE ENCOUNTER
Failed protocol pt needs to be seen.    Requesting amLODIPine Besylate 10 MG   LOV: 7/12/2019  RTC: 3-4 weeks  Last Relevant Labs: 5/10/2020  Filled: 6/28/19 #90 with 1 refills    Requesting POTASSIUM CHLORIDE ER 10 MEQ Oral   Filled: 10/17/2019 #30 with 0r

## 2021-01-19 NOTE — TELEPHONE ENCOUNTER
Patient scheduled appointment    Future Appointments   Date Time Provider Joce Holguin   2/16/2021 10:30 AM Emteerio Grace MD [facility] [place]

## 2021-01-21 RX ORDER — AMLODIPINE BESYLATE 10 MG/1
TABLET ORAL
Qty: 90 TABLET | Refills: 0 | Status: SHIPPED | OUTPATIENT
Start: 2021-01-21 | End: 2021-08-03

## 2021-01-21 RX ORDER — POTASSIUM CHLORIDE 750 MG/1
TABLET, FILM COATED, EXTENDED RELEASE ORAL
Qty: 30 TABLET | Refills: 0 | Status: SHIPPED | OUTPATIENT
Start: 2021-01-21

## 2021-05-27 ENCOUNTER — OFFICE VISIT (OUTPATIENT)
Dept: INTERNAL MEDICINE CLINIC | Facility: CLINIC | Age: 61
End: 2021-05-27
Payer: COMMERCIAL

## 2021-05-27 VITALS
WEIGHT: 209 LBS | BODY MASS INDEX: 27.7 KG/M2 | DIASTOLIC BLOOD PRESSURE: 100 MMHG | RESPIRATION RATE: 18 BRPM | TEMPERATURE: 98 F | SYSTOLIC BLOOD PRESSURE: 140 MMHG | HEIGHT: 73 IN | HEART RATE: 92 BPM | OXYGEN SATURATION: 99 %

## 2021-05-27 DIAGNOSIS — M54.41 ACUTE RIGHT-SIDED LOW BACK PAIN WITH RIGHT-SIDED SCIATICA: Primary | ICD-10-CM

## 2021-05-27 DIAGNOSIS — I10 ESSENTIAL HYPERTENSION: ICD-10-CM

## 2021-05-27 DIAGNOSIS — Z12.5 SCREENING FOR PROSTATE CANCER: ICD-10-CM

## 2021-05-27 DIAGNOSIS — Z00.00 LABORATORY EXAM ORDERED AS PART OF ROUTINE GENERAL MEDICAL EXAMINATION: ICD-10-CM

## 2021-05-27 PROCEDURE — 3008F BODY MASS INDEX DOCD: CPT | Performed by: INTERNAL MEDICINE

## 2021-05-27 PROCEDURE — 3077F SYST BP >= 140 MM HG: CPT | Performed by: INTERNAL MEDICINE

## 2021-05-27 PROCEDURE — 99213 OFFICE O/P EST LOW 20 MIN: CPT | Performed by: INTERNAL MEDICINE

## 2021-05-27 PROCEDURE — 3080F DIAST BP >= 90 MM HG: CPT | Performed by: INTERNAL MEDICINE

## 2021-05-27 RX ORDER — NAPROXEN 500 MG/1
500 TABLET ORAL 2 TIMES DAILY
COMMUNITY
Start: 2021-05-21

## 2021-05-27 RX ORDER — METHYLPREDNISOLONE 4 MG/1
TABLET ORAL
Qty: 1 EACH | Refills: 0 | Status: SHIPPED | OUTPATIENT
Start: 2021-05-27

## 2021-05-27 RX ORDER — DIAZEPAM 5 MG/1
5 TABLET ORAL 2 TIMES DAILY
COMMUNITY
Start: 2021-05-21

## 2021-05-27 RX ORDER — HYDROCODONE BITARTRATE AND ACETAMINOPHEN 5; 325 MG/1; MG/1
1 TABLET ORAL EVERY 8 HOURS PRN
Qty: 10 TABLET | Refills: 0 | Status: SHIPPED | OUTPATIENT
Start: 2021-05-27

## 2021-05-27 NOTE — PATIENT INSTRUCTIONS
Start medrol dose pack and take breakfast and lunch doses together this afternoon    Hydrocodone 1/2-1 tab as needed for severe pain     Blood work prior to next appointment in 1-2 months for physical and blood pressure check

## 2021-05-27 NOTE — PROGRESS NOTES
[facility] Internal Medicine Office Note  Chief Complaint:   Patient presents with:  Medication Follow-Up  Pain      HPI:   This is a 63year old male coming in for back pain  HPI    A week ago he woke up with back pain   [facility] Tab TAKE 1 TABLET(10 MG) BY MOUTH DAILY 90 tablet 0   • POTASSIUM CHLORIDE ER 10 MEQ Oral Tab CR TAKE 1 TABLET(10 MEQ) BY MOUTH DAILY 30 tablet 0         REVIEW OF SYSTEMS:   Review of Systems   Constitutional: Negative for fever.    Eyes: Negative for visu hypertension  Laboratory exam ordered as part of routine general medical examination  Screening for prostate cancer      The plan is as follows  Evelyne Rosas was seen today for medication follow-up and pain.     Diagnoses and all orders for this visit:    Acute 2) Never done  Annual Depression Screen due on 01/22/2020  Annual Physical due on 07/12/2020  Patient/Caregiver Education: Patient/Caregiver Education: There are no barriers to learning. Medical education done. Outcome: Patient verbalizes understanding.  Pa

## 2021-08-03 NOTE — TELEPHONE ENCOUNTER
Name from pharmacy: AMLODIPINE BESYLATE 10MG TABLETS         Will file in chart as: AMLODIPINE BESYLATE 10 MG Oral Tab    Sig: TAKE 1 TABLET(10 MG) BY MOUTH DAILY    Disp:  90 tablet    Refills:  0 (Pharmacy requested: Not specified)    Start: 8/3/2021

## 2021-08-06 RX ORDER — AMLODIPINE BESYLATE 10 MG/1
TABLET ORAL
Qty: 90 TABLET | Refills: 3 | Status: SHIPPED | OUTPATIENT
Start: 2021-08-06

## 2022-03-11 RX ORDER — POTASSIUM CHLORIDE 750 MG/1
TABLET, FILM COATED, EXTENDED RELEASE ORAL
Qty: 30 TABLET | Refills: 0 | Status: SHIPPED | OUTPATIENT
Start: 2022-03-11

## 2022-03-11 NOTE — TELEPHONE ENCOUNTER
LOV: 5/27/2021 with Dr. Quevedo Lung   RTC: 2 months  Last Relevant Labs: 5/10/2020   Filled: 1/21/2021    #30 with 0 refills    No future appointments.

## 2022-11-19 RX ORDER — AMLODIPINE BESYLATE 10 MG/1
TABLET ORAL
Qty: 90 TABLET | Refills: 0 | Status: SHIPPED | OUTPATIENT
Start: 2022-11-19

## 2022-11-19 NOTE — TELEPHONE ENCOUNTER
Please call patient to schedule physical; last seen over 1 year ago.  Let him know I sent a 90 day supply to pharmacy

## 2022-11-21 NOTE — TELEPHONE ENCOUNTER
Patient informed. Appt scheduled.     Future Appointments   Date Time Provider Joce Stroudi   12/9/2022 11:15 AM Bailey Jimenez MD [facility] [place]

## 2023-02-28 ENCOUNTER — OFFICE VISIT (OUTPATIENT)
Dept: FAMILY MEDICINE CLINIC | Facility: CLINIC | Age: 63
End: 2023-02-28
Payer: COMMERCIAL

## 2023-02-28 VITALS
OXYGEN SATURATION: 97 % | TEMPERATURE: 97 F | HEIGHT: 73 IN | HEART RATE: 97 BPM | SYSTOLIC BLOOD PRESSURE: 156 MMHG | RESPIRATION RATE: 18 BRPM | DIASTOLIC BLOOD PRESSURE: 98 MMHG | BODY MASS INDEX: 29.16 KG/M2 | WEIGHT: 220 LBS

## 2023-02-28 DIAGNOSIS — R01.1 HEART MURMUR: ICD-10-CM

## 2023-02-28 DIAGNOSIS — J06.9 VIRAL URI WITH COUGH: Primary | ICD-10-CM

## 2023-02-28 DIAGNOSIS — R03.0 ELEVATED BLOOD PRESSURE READING: ICD-10-CM

## 2023-02-28 DIAGNOSIS — H92.01 RIGHT EAR PAIN: ICD-10-CM

## 2023-02-28 PROCEDURE — 3077F SYST BP >= 140 MM HG: CPT | Performed by: NURSE PRACTITIONER

## 2023-02-28 PROCEDURE — 3008F BODY MASS INDEX DOCD: CPT | Performed by: NURSE PRACTITIONER

## 2023-02-28 PROCEDURE — 3080F DIAST BP >= 90 MM HG: CPT | Performed by: NURSE PRACTITIONER

## 2023-02-28 PROCEDURE — 99214 OFFICE O/P EST MOD 30 MIN: CPT | Performed by: NURSE PRACTITIONER

## 2023-02-28 RX ORDER — BENZONATATE 200 MG/1
200 CAPSULE ORAL 3 TIMES DAILY PRN
Qty: 30 CAPSULE | Refills: 0 | Status: SHIPPED | OUTPATIENT
Start: 2023-02-28

## 2023-02-28 NOTE — PATIENT INSTRUCTIONS
Call your primary care doctor's office for an appointment this week. You can also try the [facility] office at [phone] to ask for providers with sooner openings. Go to the ER for any worsening or severe symptoms such as difficulty breathing or chest pain.

## 2023-03-06 RX ORDER — AMLODIPINE BESYLATE 10 MG/1
TABLET ORAL
Qty: 90 TABLET | Refills: 0 | OUTPATIENT
Start: 2023-03-06

## 2023-03-06 NOTE — TELEPHONE ENCOUNTER
Protocol failed     Requesting: amlodipine 10mg     LOV: 5/27/21   RTC: 2 months   Filled: 11/19/22 # 90 0 refills   Recent Labs: 5/10/20     Upcoming OV: none scheduled

## (undated) DEVICE — Device: Brand: DEFENDO AIR/WATER/SUCTION AND BIOPSY VALVE

## (undated) DEVICE — ENDOSCOPY PACK - LOWER: Brand: MEDLINE INDUSTRIES, INC.

## (undated) DEVICE — 3M™ RED DOT™ MONITORING ELECTRODE WITH FOAM TAPE AND STICKY GEL, 50/BAG, 20/CASE, 72/PLT 2570: Brand: RED DOT™

## (undated) DEVICE — 1200CC GUARDIAN II: Brand: GUARDIAN

## (undated) DEVICE — FILTERLINE NASAL ADULT O2/CO2

## (undated) NOTE — ED AVS SNAPSHOT
THE [facility] Emergency Department in [address] [facility] [address] [facility]    Phone:  [phone]    Fax:  [phone]           Mansi Hill.    MRN: [id number]    Department:  THE [facility] Emergency Department in [place]   Date of Visi nuestro adminstrador de cameron ponce [phone]    Expect to receive an electronic request (by e-mail or text) to complete a self-assessment the day after your visit. You may also receive a call from our patient liason soon after your visit.  Also, some p [facility] ([address]) [address]  ([address]) [address] [facility]. ([address]) [address] (8 not sign up before the expiration date, you must request a new code. Your unique [employer] Access Code: [email or web address]  Expires: 6/29/2017 10:50 AM    If you have questions, you can call [phone] to talk to our [facility] Staff.  Remember, [employer]

## (undated) NOTE — LETTER
02/21/19        [address]  [facility] [address]      Dear Roula Su,    [address] [facility] records indicate that you have outstanding lab work and or testing that was ordered for you and has not yet been completed: Non fasting lab work  To

## (undated) NOTE — LETTER
06/28/21        [place] [address]      Dear Shelbi Ribera,    [address] [facility] records indicate that you have outstanding lab work and or testing that was ordered for you and has not yet been completed:  Orders Placed This [place]

## (undated) NOTE — LETTER
11/16/19        [address] Vick [facility] [address]      Dear [place],    [address] [facility] records indicate that you have outstanding lab work and or testing that was ordered for you and has not yet been completed:Non Fasting Lab Work   To

## (undated) NOTE — ED AVS SNAPSHOT
Dora Harkins. MRN: [id number]    Department:  [facility] in [place]   Date of Visit:  9/3/2018           Disclosure     Insurance plans vary and the physician(s) referred by the ER may not be covered by your plan.  Please conta tell this physician (or your personal doctor if your instructions are to return to your personal doctor) about any new or lasting problems. The primary care or specialist physician will see patients referred from the [facility] Emergency Department.  Jonel Perrin

## (undated) NOTE — ED AVS SNAPSHOT
Pat Pickering. MRN: [id number]    Department:  [address] [facility] in [place]   Date of Visit:  10/16/2019           Disclosure     Insurance plans vary and the physician(s) referred by the ER may not be covered by your plan.  Please con tell this physician (or your personal doctor if your instructions are to return to your personal doctor) about any new or lasting problems. The primary care or specialist physician will see patients referred from the [facility] Emergency Department.  Klaus Mortensen

## (undated) NOTE — ED AVS SNAPSHOT
THE [facility] Emergency Department in [address] [facility]    Phone:  [phone]    Fax:  [phone]           Audelia Rodriguez.    MRN: [id number]    Department:  THE [facility] Emergency Department in [place]   Date of Visi IF THERE IS ANY CHANGE OR WORSENING OF YOUR CONDITION, CALL YOUR PRIMARY CARE PHYSICIAN AT ONCE OR RETURN IMMEDIATELY TO THE EMERGENCY DEPARTMENT.     If you have been prescribed any medication(s), please fill your prescription right away and begin taking t

## (undated) NOTE — ED AVS SNAPSHOT
Jeb Maloney. MRN: [id number]    Department:  [facility] Emergency Department in [place]   Date of Visit:  3/8/2020           Disclosure     Insurance plans vary and the physician(s) referred by the ER may not be covered by your plan.  Please conta tell this physician (or your personal doctor if your instructions are to return to your personal doctor) about any new or lasting problems. The primary care or specialist physician will see patients referred from the [facility] Emergency Department.  Jonel Perrin

## (undated) NOTE — MR AVS SNAPSHOT
[facility] [address]  [address] [facility] [place]  [phone]               Thank you for choosing us for your health care visit with Elaine Valdovinos MD.  We are glad to serve you and happy to provide you with [facility]   Phone:  [phone]   Fax:  [phone]         Referral Orders      Normal Orders This Visit    GASTRO - INTERNAL [[id number] CUSTOM]  Order #:  [id number]         **REFERRAL REQUEST**    Your physician has referred you to a •The Lab is here [address] Mon, Tues, Friday 8am-4pm in office and Wed, Thurs are 7am-3pm, plus most Saturday 830am-12p. call [phone] but walk-in is fine.    •To schedule Imaging or tests at [facility] Schedule [phone], Go to [facility] ER Buil refilled at an office visit only. If your prescription is due for a refill, you may be due for a follow up appointment. We cannot refill your maintenance medications at a preventative wellness visit.   To best provide you care, patients receiving maintena Your blood pressure indicates you may be at-risk for Hypertension. Please consider the following Lifestyle Modifications. Also, please return for a follow-up Blood Pressure Check in 1 month.      Lifestyle Modification Recommendations:    Modification R Start activities slowly and build up over time Do what you like   Get your heart pumping – brisk walking, biking, swimming Even 10 minute increments are effective and add up over the week   2 ½ hours per week – spread out over time Use a bobby to keep you